# Patient Record
Sex: FEMALE | Race: BLACK OR AFRICAN AMERICAN | NOT HISPANIC OR LATINO | Employment: UNEMPLOYED | URBAN - METROPOLITAN AREA
[De-identification: names, ages, dates, MRNs, and addresses within clinical notes are randomized per-mention and may not be internally consistent; named-entity substitution may affect disease eponyms.]

---

## 2023-06-16 LAB
NIL(NEG) CONTROL SPOT COUNT: NORMAL
PANEL A SPOT COUNT: 0
PANEL B SPOT COUNT: 0
POS CONTROL SPOT COUNT: NORMAL
T-SPOT. TB INTERPRETATION: NEGATIVE

## 2023-08-30 ENCOUNTER — HOSPITAL ENCOUNTER (OUTPATIENT)
Dept: DATA CONVERSION | Facility: HOSPITAL | Age: 38
End: 2023-08-30
Attending: INTERNAL MEDICINE | Admitting: INTERNAL MEDICINE

## 2023-08-30 DIAGNOSIS — R68.81 EARLY SATIETY: ICD-10-CM

## 2023-08-30 DIAGNOSIS — K92.0 HEMATEMESIS: ICD-10-CM

## 2023-08-30 DIAGNOSIS — K21.9 GASTRO-ESOPHAGEAL REFLUX DISEASE WITHOUT ESOPHAGITIS: ICD-10-CM

## 2023-08-30 DIAGNOSIS — R11.2 NAUSEA WITH VOMITING, UNSPECIFIED: ICD-10-CM

## 2023-08-30 DIAGNOSIS — G43.A0 CYCLICAL VOMITING, IN MIGRAINE, NOT INTRACTABLE: ICD-10-CM

## 2023-08-30 DIAGNOSIS — R10.9 UNSPECIFIED ABDOMINAL PAIN: ICD-10-CM

## 2023-08-31 LAB
COMPLETE PATHOLOGY REPORT: NORMAL
CONVERTED CLINICAL DIAGNOSIS-HISTORY: NORMAL
CONVERTED DIAGNOSIS COMMENT: NORMAL
CONVERTED FINAL DIAGNOSIS: NORMAL
CONVERTED FINAL REPORT PDF LINK TO COPY AND PASTE: NORMAL
CONVERTED SPECIMEN DESCRIPTION: NORMAL

## 2023-09-05 LAB
COMPLETE PATHOLOGY REPORT: NORMAL
CONVERTED CLINICAL DIAGNOSIS-HISTORY: NORMAL
CONVERTED FINAL DIAGNOSIS: NORMAL
CONVERTED FINAL REPORT PDF LINK TO COPY AND PASTE: NORMAL
CONVERTED GROSS DESCRIPTION: NORMAL

## 2023-09-29 VITALS — BODY MASS INDEX: 27.68 KG/M2 | HEIGHT: 67 IN | WEIGHT: 176.37 LBS

## 2024-01-23 ENCOUNTER — HOSPITAL ENCOUNTER (EMERGENCY)
Facility: HOSPITAL | Age: 39
Discharge: HOME | End: 2024-01-23
Payer: COMMERCIAL

## 2024-01-23 VITALS
HEIGHT: 67 IN | SYSTOLIC BLOOD PRESSURE: 114 MMHG | RESPIRATION RATE: 18 BRPM | HEART RATE: 90 BPM | DIASTOLIC BLOOD PRESSURE: 60 MMHG | WEIGHT: 200 LBS | BODY MASS INDEX: 31.39 KG/M2 | OXYGEN SATURATION: 95 % | TEMPERATURE: 97 F

## 2024-01-23 DIAGNOSIS — R51.9 ACUTE NONINTRACTABLE HEADACHE, UNSPECIFIED HEADACHE TYPE: Primary | ICD-10-CM

## 2024-01-23 LAB — PREGNANCY TEST URINE, POC: NEGATIVE

## 2024-01-23 PROCEDURE — 96375 TX/PRO/DX INJ NEW DRUG ADDON: CPT

## 2024-01-23 PROCEDURE — 96374 THER/PROPH/DIAG INJ IV PUSH: CPT

## 2024-01-23 PROCEDURE — 99284 EMERGENCY DEPT VISIT MOD MDM: CPT

## 2024-01-23 PROCEDURE — 99284 EMERGENCY DEPT VISIT MOD MDM: CPT | Performed by: NURSE PRACTITIONER

## 2024-01-23 PROCEDURE — 81025 URINE PREGNANCY TEST: CPT | Performed by: NURSE PRACTITIONER

## 2024-01-23 PROCEDURE — 2500000004 HC RX 250 GENERAL PHARMACY W/ HCPCS (ALT 636 FOR OP/ED): Performed by: NURSE PRACTITIONER

## 2024-01-23 PROCEDURE — 2500000004 HC RX 250 GENERAL PHARMACY W/ HCPCS (ALT 636 FOR OP/ED)

## 2024-01-23 RX ORDER — KETOROLAC TROMETHAMINE 15 MG/ML
15 INJECTION, SOLUTION INTRAMUSCULAR; INTRAVENOUS ONCE
Status: COMPLETED | OUTPATIENT
Start: 2024-01-23 | End: 2024-01-23

## 2024-01-23 RX ORDER — METOCLOPRAMIDE HYDROCHLORIDE 5 MG/ML
10 INJECTION INTRAMUSCULAR; INTRAVENOUS ONCE
Status: COMPLETED | OUTPATIENT
Start: 2024-01-23 | End: 2024-01-23

## 2024-01-23 RX ORDER — KETOROLAC TROMETHAMINE 15 MG/ML
INJECTION, SOLUTION INTRAMUSCULAR; INTRAVENOUS
Status: COMPLETED
Start: 2024-01-23 | End: 2024-01-23

## 2024-01-23 RX ADMIN — KETOROLAC TROMETHAMINE 15 MG: 15 INJECTION, SOLUTION INTRAMUSCULAR; INTRAVENOUS at 14:33

## 2024-01-23 RX ADMIN — SODIUM CHLORIDE 1000 ML: 9 INJECTION, SOLUTION INTRAVENOUS at 14:33

## 2024-01-23 RX ADMIN — METOCLOPRAMIDE 10 MG: 5 INJECTION, SOLUTION INTRAMUSCULAR; INTRAVENOUS at 14:33

## 2024-01-23 ASSESSMENT — COLUMBIA-SUICIDE SEVERITY RATING SCALE - C-SSRS
2. HAVE YOU ACTUALLY HAD ANY THOUGHTS OF KILLING YOURSELF?: NO
6. HAVE YOU EVER DONE ANYTHING, STARTED TO DO ANYTHING, OR PREPARED TO DO ANYTHING TO END YOUR LIFE?: NO
1. IN THE PAST MONTH, HAVE YOU WISHED YOU WERE DEAD OR WISHED YOU COULD GO TO SLEEP AND NOT WAKE UP?: NO

## 2024-01-23 ASSESSMENT — PAIN DESCRIPTION - PAIN TYPE: TYPE: ACUTE PAIN

## 2024-01-23 ASSESSMENT — PAIN SCALES - GENERAL: PAINLEVEL_OUTOF10: 4

## 2024-01-23 ASSESSMENT — PAIN DESCRIPTION - LOCATION: LOCATION: HEAD

## 2024-01-23 ASSESSMENT — PAIN - FUNCTIONAL ASSESSMENT: PAIN_FUNCTIONAL_ASSESSMENT: 0-10

## 2024-01-23 ASSESSMENT — ENCOUNTER SYMPTOMS: HEADACHES: 1

## 2024-01-23 NOTE — ED TRIAGE NOTES
Patient presents to the Emergency department with a chief complaint of migraines with associated vision changes. Patient denies any trauma./

## 2024-01-23 NOTE — ED PROVIDER NOTES
Emergency Department Encounter  [facility] EMERGENCY MEDICINE    Patient: Kymberly Eddy  MRN: [id number]  : 1985  Date of Evaluation: 2024  ED Provider: WANDA Brown      Chief Complaint       Chief Complaint   Patient presents with    Headache        Limitations to History: none  Historian: patient  Records reviewed: EMR inpatient and outpatient notes, Care Everywhere    This is a 38-year-old female with a PMH of migraines who presents to the emergency room with a headache.  Patient states that she developed a gradual onset left-sided headache a couple of days ago.  Patient describes the pain as a sharp, throbbing constant pain rating it a 10 out of 10.  Denies taking any over-the-counter medications prior to arrival.  Patient states that she has bilateral blurry vision in which she is attributing that to not wearing her glasses.  Denies any nausea, vomiting, fevers or chills.      PMH: Migraines, uterine fibroids  PSH: Denies  Allergies: Denies  Social HX: + smoker, denies alcohol or drug use.  Family HX: No family history pertinent to current presenting problem  Medications: Reviewed per EMR    ROS:     Review of Systems   Neurological:  Positive for headaches.     14 systems reviewed and otherwise acutely negative except as in the [place].        Past History   No past medical history on file.  No past surgical history on file.      Medications/Allergies     Previous Medications    No medications on file     No Known Allergies     Physical Exam       ED Triage Vitals   Temperature Heart Rate Respirations BP   24 1402 24 1402 24 1402 24 1404   36.1 °C (97 °F) 90 18 114/60      Pulse Ox Temp Source Heart Rate Source Patient Position   24 1402 24 1402 -- --   95 % Temporal        BP Location FiO2 (%)     -- --             Physical Exam:    Appearance: Alert, oriented , cooperative,  in no acute distress. Well nourished & well  "hydrated.    Skin: Intact,  dry skin, no lesions, rash, petechiae or purpura.     Eyes: PERRLA, EOMs intact.    ENT: Hearing grossly intact. External auditory canals patent, tympanic membranes intact with visible landmarks. Nares patent, mucus membranes moist. Dentition without lesions. Pharynx clear, uvula midline.     Neck: Supple, without meningismus.     Pulmonary: Clear bilaterally with good chest wall excursion. No rales, rhonchi or wheezing. No accessory muscle use or stridor.    Cardiac: Normal S1, S2 without murmur, rub, gallop or extrasystole. No JVD, Carotids without bruits.    Abdomen: Soft, nontender, active bowel sounds.  No palpable organomegaly.  No rebound or guarding.      Musculoskeletal: Full range of motion. no pain, edema, or deformity. Pulses full and equal. No cyanosis, clubbing, or edema.    Neurological:  Normal sensation, no weakness, no focal findings identified.    Psychiatric: Appropriate mood and affect.       Diagnostics   Labs:  Results for orders placed or performed during the hospital encounter of 01/23/24 (from the past 24 hour(s))   POCT pregnancy, urine   Result Value Ref Range    Preg Test, Ur Negative Negative      Radiographs:  No orders to display           Assessment   In brief, Kymberly Eddy is a 38 y.o. female who presented to the emergency department with a headache.          ED Course/MDM   Visit Vitals  /60   Pulse 90   Temp 36.1 °C (97 °F) (Temporal)   Resp 18   Ht 1.702 m (5' 7\")   Wt 90.7 kg (200 lb)   SpO2 95%   BMI 31.32 kg/m²   BSA 2.07 m²       Medications   ketorolac (Toradol) injection 15 mg (15 mg intravenous Given 1/23/24 1433)   metoclopramide (Reglan) injection 10 mg (10 mg intravenous Given 1/23/24 1433)   sodium chloride 0.9 % bolus 1,000 mL (1,000 mL intravenous New Bag 1/23/24 1433)       Patient remained stable while in the emergency department. Previous outpatient and ED records were reviewed. Outside records were reviewed.  Urine " pregnancy test was negative.  IV established.  Patient received Toradol 15 mg IV once, Reglan 10 mg IV once and 1 L of IV fluids.  RN called the patient to reevaluate her while in the waiting room and patient did not answer.  It appears the patient left without treatment complete.    Final Impression    Headache    DISPOSITION  Disposition: Left without treatment complete    Comment: Please note this report has been produced using speech recognition software and may contain errors related to that system including errors in grammar, punctuation, and spelling, as well as words and phrases that may be inappropriate.  If there are any questions or concerns please feel free to contact the dictating provider for clarification.    Nell Blanchard, BERKLEY-NICK Blanchard APRN-NICK  01/23/24 1943